# Patient Record
Sex: FEMALE | Race: OTHER | HISPANIC OR LATINO
[De-identification: names, ages, dates, MRNs, and addresses within clinical notes are randomized per-mention and may not be internally consistent; named-entity substitution may affect disease eponyms.]

---

## 2021-05-25 ENCOUNTER — APPOINTMENT (OUTPATIENT)
Dept: ANTEPARTUM | Facility: CLINIC | Age: 31
End: 2021-05-25

## 2021-05-25 PROBLEM — Z00.00 ENCOUNTER FOR PREVENTIVE HEALTH EXAMINATION: Status: ACTIVE | Noted: 2021-05-25

## 2021-06-03 ENCOUNTER — APPOINTMENT (OUTPATIENT)
Dept: ANTEPARTUM | Facility: CLINIC | Age: 31
End: 2021-06-03
Payer: COMMERCIAL

## 2021-06-03 ENCOUNTER — ASOB RESULT (OUTPATIENT)
Age: 31
End: 2021-06-03

## 2021-06-03 PROCEDURE — 99072 ADDL SUPL MATRL&STAF TM PHE: CPT

## 2021-06-03 PROCEDURE — 76813 OB US NUCHAL MEAS 1 GEST: CPT

## 2021-07-27 ENCOUNTER — ASOB RESULT (OUTPATIENT)
Age: 31
End: 2021-07-27

## 2021-07-27 ENCOUNTER — APPOINTMENT (OUTPATIENT)
Dept: ANTEPARTUM | Facility: CLINIC | Age: 31
End: 2021-07-27
Payer: COMMERCIAL

## 2021-07-27 PROCEDURE — 99072 ADDL SUPL MATRL&STAF TM PHE: CPT

## 2021-07-27 PROCEDURE — 76805 OB US >/= 14 WKS SNGL FETUS: CPT

## 2021-08-16 ENCOUNTER — APPOINTMENT (OUTPATIENT)
Dept: MATERNAL FETAL MEDICINE | Facility: CLINIC | Age: 31
End: 2021-08-16
Payer: COMMERCIAL

## 2021-08-16 ENCOUNTER — ASOB RESULT (OUTPATIENT)
Age: 31
End: 2021-08-16

## 2021-08-16 PROCEDURE — 99202 OFFICE O/P NEW SF 15 MIN: CPT | Mod: 95

## 2021-11-23 ENCOUNTER — INPATIENT (INPATIENT)
Facility: HOSPITAL | Age: 31
LOS: 2 days | Discharge: HOME | End: 2021-11-26
Attending: SPECIALIST | Admitting: SPECIALIST
Payer: COMMERCIAL

## 2021-11-23 ENCOUNTER — TRANSCRIPTION ENCOUNTER (OUTPATIENT)
Age: 31
End: 2021-11-23

## 2021-11-23 ENCOUNTER — RESULT REVIEW (OUTPATIENT)
Age: 31
End: 2021-11-23

## 2021-11-23 VITALS
RESPIRATION RATE: 16 BRPM | DIASTOLIC BLOOD PRESSURE: 74 MMHG | SYSTOLIC BLOOD PRESSURE: 126 MMHG | TEMPERATURE: 98 F | HEART RATE: 95 BPM

## 2021-11-23 LAB
BASOPHILS # BLD AUTO: 0.03 K/UL — SIGNIFICANT CHANGE UP (ref 0–0.2)
BASOPHILS NFR BLD AUTO: 0.3 % — SIGNIFICANT CHANGE UP (ref 0–2)
BLD GP AB SCN SERPL QL: SIGNIFICANT CHANGE UP
COVID-19 SPIKE DOMAIN AB INTERP: POSITIVE
COVID-19 SPIKE DOMAIN ANTIBODY RESULT: 183 U/ML — HIGH
EOSINOPHIL # BLD AUTO: 0.08 K/UL — SIGNIFICANT CHANGE UP (ref 0–0.5)
EOSINOPHIL NFR BLD AUTO: 0.8 % — SIGNIFICANT CHANGE UP (ref 0–6)
HCT VFR BLD CALC: 34.6 % — SIGNIFICANT CHANGE UP (ref 34.5–45)
HGB BLD-MCNC: 11.5 G/DL — SIGNIFICANT CHANGE UP (ref 11.5–15.5)
HIV 1 & 2 AB SERPL IA.RAPID: SIGNIFICANT CHANGE UP
IMM GRANULOCYTES NFR BLD AUTO: 0.7 % — SIGNIFICANT CHANGE UP (ref 0–1.5)
LYMPHOCYTES # BLD AUTO: 1.99 K/UL — SIGNIFICANT CHANGE UP (ref 1–3.3)
LYMPHOCYTES # BLD AUTO: 19 % — SIGNIFICANT CHANGE UP (ref 13–44)
MCHC RBC-ENTMCNC: 27.7 PG — SIGNIFICANT CHANGE UP (ref 27–34)
MCHC RBC-ENTMCNC: 33.2 GM/DL — SIGNIFICANT CHANGE UP (ref 32–36)
MCV RBC AUTO: 83.4 FL — SIGNIFICANT CHANGE UP (ref 80–100)
MONOCYTES # BLD AUTO: 0.56 K/UL — SIGNIFICANT CHANGE UP (ref 0–0.9)
MONOCYTES NFR BLD AUTO: 5.4 % — SIGNIFICANT CHANGE UP (ref 2–14)
NEUTROPHILS # BLD AUTO: 7.72 K/UL — HIGH (ref 1.8–7.4)
NEUTROPHILS NFR BLD AUTO: 73.8 % — SIGNIFICANT CHANGE UP (ref 43–77)
PLATELET # BLD AUTO: 213 K/UL — SIGNIFICANT CHANGE UP (ref 150–400)
RBC # BLD: 4.15 M/UL — SIGNIFICANT CHANGE UP (ref 3.8–5.2)
RBC # FLD: 16.5 % — HIGH (ref 10.3–14.5)
SARS-COV-2 IGG+IGM SERPL QL IA: 183 U/ML — HIGH
SARS-COV-2 IGG+IGM SERPL QL IA: POSITIVE
SARS-COV-2 RNA SPEC QL NAA+PROBE: SIGNIFICANT CHANGE UP
T PALLIDUM AB TITR SER: NEGATIVE — SIGNIFICANT CHANGE UP
WBC # BLD: 10.45 K/UL — SIGNIFICANT CHANGE UP (ref 3.8–10.5)
WBC # FLD AUTO: 10.45 K/UL — SIGNIFICANT CHANGE UP (ref 3.8–10.5)

## 2021-11-23 RX ORDER — CITRIC ACID/SODIUM CITRATE 300-500 MG
30 SOLUTION, ORAL ORAL ONCE
Refills: 0 | Status: COMPLETED | OUTPATIENT
Start: 2021-11-23 | End: 2021-11-23

## 2021-11-23 RX ORDER — OXYTOCIN 10 UNIT/ML
333.33 VIAL (ML) INJECTION
Qty: 20 | Refills: 0 | Status: COMPLETED | OUTPATIENT
Start: 2021-11-23 | End: 2021-11-23

## 2021-11-23 RX ORDER — SODIUM CHLORIDE 9 MG/ML
1000 INJECTION, SOLUTION INTRAVENOUS
Refills: 0 | Status: DISCONTINUED | OUTPATIENT
Start: 2021-11-23 | End: 2021-11-24

## 2021-11-23 RX ORDER — AMPICILLIN TRIHYDRATE 250 MG
1 CAPSULE ORAL EVERY 4 HOURS
Refills: 0 | Status: DISCONTINUED | OUTPATIENT
Start: 2021-11-23 | End: 2021-11-24

## 2021-11-23 RX ORDER — ACETAMINOPHEN 500 MG
975 TABLET ORAL ONCE
Refills: 0 | Status: COMPLETED | OUTPATIENT
Start: 2021-11-23 | End: 2021-11-23

## 2021-11-23 RX ORDER — OXYTOCIN 10 UNIT/ML
VIAL (ML) INJECTION
Qty: 30 | Refills: 0 | Status: DISCONTINUED | OUTPATIENT
Start: 2021-11-23 | End: 2021-11-24

## 2021-11-23 RX ORDER — AMPICILLIN TRIHYDRATE 250 MG
2 CAPSULE ORAL ONCE
Refills: 0 | Status: COMPLETED | OUTPATIENT
Start: 2021-11-23 | End: 2021-11-23

## 2021-11-23 RX ADMIN — Medication 108 GRAM(S): at 22:54

## 2021-11-23 RX ADMIN — Medication 108 GRAM(S): at 14:47

## 2021-11-23 RX ADMIN — Medication 975 MILLIGRAM(S): at 19:25

## 2021-11-23 RX ADMIN — SODIUM CHLORIDE 125 MILLILITER(S): 9 INJECTION, SOLUTION INTRAVENOUS at 10:41

## 2021-11-23 RX ADMIN — Medication 108 GRAM(S): at 18:44

## 2021-11-23 RX ADMIN — Medication 216 GRAM(S): at 10:45

## 2021-11-23 RX ADMIN — Medication 2 MILLIUNIT(S)/MIN: at 18:34

## 2021-11-23 RX ADMIN — Medication 30 MILLILITER(S): at 12:05

## 2021-11-23 NOTE — OB PROVIDER LABOR PROGRESS NOTE - ASSESSMENT
patient in labor   continue expectant management   FHRT reassuring
pt had a temp of 100.6, decreased to 100 s/p tylenol 975  pt SROM at 0500 this AM  cat 1 tracing  continuous fetal and toco monitoring

## 2021-11-23 NOTE — OB PROVIDER H&P - ATTENDING COMMENTS
patient admitted at 38 weeks 2 days in labor  presents with regular contractions and rupture of membranes  pregnancy uncomplicated  requesting an epidural  FHRT reassuring  GBS positive - intrapartum ampicillin  CVOID pending

## 2021-11-23 NOTE — OB PROVIDER H&P - ASSESSMENT
31y  @38w4d GA admitted in labor, SROM clear 0500 this morning.    A/P:   -Admit to L&D  -Consent  -Admission labs  -NPO, except ice chips   -IV fluids  -Fetus: Cat 1 tracing. Continuous toco and fetal monitoring.   -GBS: pos, on amp  -Analgesia: epidural PRN 31y  @38w4d GA admitted in early labor, SROM clear 0500 this morning.    A/P:   -Admit to L&D  -Consent  -Admission labs  -NPO, except ice chips   -IV fluids  -Fetus: Cat 1 tracing. Continuous toco and fetal monitoring.   -GBS: pos, on amp  -Analgesia: epidural PRN    discussed with Dr. Edwards 31y  @38w4d GA admitted in early labor with ruptured membranes.    A/P:   -Admit to L&D  -Consent  -Admission labs  -NPO, except ice chips   -IV fluids  -Fetus: Cat 1 tracing. Continuous toco and fetal monitoring.   -GBS: pos, on amp  -Analgesia: epidural PRN    discussed with Dr. Edwards 31y  @38w4d GA admitted in early labor with ruptured membranes.    A/P:   -Admit to L&D  -Consent  -Admission labs  -NPO, except ice chips   -IV fluids  -Fetus: Cat 1 tracing. Continuous toco and fetal monitoring.   -GBS: pos, on ampicillin  -Analgesia: epidural PRN    discussed with Dr. Edwards

## 2021-11-23 NOTE — OB PROVIDER H&P - NSHPPHYSICALEXAM_GEN_ALL_CORE
Vitals:  HR: 95 (23 Nov 2021 10:15) (95 - 95)  BP: 126/74 (23 Nov 2021 10:15) (126/74 - 126/74)    Gen: well-appearing  Resp: breathing comfortably on RA  Abd:  VE:    Bedside sono:    FHT:   [place]: Vitals:  HR: 95 (23 Nov 2021 10:15) (95 - 95)  BP: 126/74 (23 Nov 2021 10:15) (126/74 - 126/74)    Gen: well-appearing, in apparent discomfort w ctx  Resp: breathing comfortably on RA  Abd: nontender, gravid  VE: 2/100/-1    Bedside sono: cephalic, ant placenta    FHT: baseline 135, mod variability, +accels, -decels   [place]: q3m

## 2021-11-23 NOTE — OB RN DELIVERY SUMMARY - NS_SEPSISRSKCALC_OBGYN_ALL_OB_FT
No temperature has been documented for this patient in CPN or on the OB Flowsheet. Ensure the highest temperature during labor was documented on the OB Flowsheet.  No gestational age at birth has been documented. Ensure delivery date/time has been entered above.  Rupture of membranes must be entered above.   EOS calculated successfully. EOS Risk Factor: 0.5/1000 live births ([facility] national incidence); GA=38w5d; Temp=100.58; ROM=19.767; GBS='Positive'; Antibiotics='GBS specific antibiotics > 2 hrs prior to birth'

## 2021-11-23 NOTE — OB RN DELIVERY SUMMARY - NSSELHIDDEN_OBGYN_ALL_OB_FT
[NS_DeliveryAttending1_OBGYN_ALL_OB_FT:[id number]],[NS_DeliveryAssist1_OBGYN_ALL_OB_FT:[id number]=],[NS_DeliveryRN_OBGYN_ALL_OB_FT:[id number]] [NS_DeliveryAttending1_OBGYN_ALL_OB_FT:[id number]],[NS_DeliveryAssist1_OBGYN_ALL_OB_FT:[id number]],[NS_DeliveryRN_OBGYN_ALL_OB_FT:[id number]=],[NS_DeliveryAssist2_OBGYN_ALL_OB_FT:[email or web address]=]

## 2021-11-23 NOTE — OB PROVIDER H&P - HISTORY OF PRESENT ILLNESS
31y  @ GA by LMP c/w CRL admitted with rupture of membranes at term.    LMP: 21  ALKA: 12/3/21   31y  @38w4d GA by LMP c/w CRL c/o leakage of clear fluid since 0500 and painful ctx q3-5m since 0600. Pt states that she has been continuously leaking since 0500 this morning, and that her ctx have been regular and painful. She desires an epidural as soon as she is able. She endorses good fetal movement. Denies vaginal bleeding. No complaints.    LMP: 21  ALKA: 12/3/21    Pregnancy course:  GBS positive  covid vaccinated    POb: denies  PGyn: denies  PMH/PSH: denies  All: denies  Meds: PNV

## 2021-11-23 NOTE — OB RN PATIENT PROFILE - BILL OF RIGHTS/ADMISSION INFORMATION PROVIDED TO:
Patient is a 69y old  Male who presents with a chief complaint of Catheter associated UTI (24 Apr 2021 11:36)       HPI:  70 y/o male with history of urinary retention due to BPH with chronic indwelling bourne x 3 years, states last changed 3 days ago after it was leaking, HTN, HLD, legally blind, CKD-4 who lives at home with help from family for ADLs. He uses a walker at baseline. He was brought in for feeling generalized weakness earlier today and felt like he was going to pass out. Denies LOC, or falls. No CP, SOB, N/V. He was noted to have low grade temp at home and in ED was 100.6 orally. U/A off new bourne placed in ED with pyuria. No hypotension, no signs of new end organ damage. He was given Cefepime and Vancomycin. Currently feels back to baseline.  (19 Apr 2021 23:19) Was found to have HARLEEN by KDIGO criteria prompting renal consult.  PAtient sees Dr. Carr Out[facility].  Bourne catheter was changed in ER.  Has abdominal pain.  Denies any N/V/Diarrhea.        Follow up HARLEEN/CKD  No complaints    PAST MEDICAL & SURGICAL HISTORY:  Diabetes    Chronic anemia  baseline Hbg 8-9    CKD (chronic kidney disease) stage 3, GFR 30-59 ml/min    Other secondary cataract of both eyes  legally blind    Urinary retention  chronic bourne    HTN (hypertension)    No significant past surgical history         FAMILY HISTORY:  Family history of diabetes mellitus (Mother)    NC    Social History:Non smoker    MEDICATIONS  (STANDING):  dextrose 40% Gel 15 Gram(s) Oral once  dextrose 5%. 1000 milliLiter(s) (100 mL/Hr) IV Continuous <Continuous>  dextrose 5%. 1000 milliLiter(s) (50 mL/Hr) IV Continuous <Continuous>  dextrose 50% Injectable 25 Gram(s) IV Push once  dextrose 50% Injectable 12.5 Gram(s) IV Push once  dextrose 50% Injectable 25 Gram(s) IV Push once  ferrous    sulfate 325 milliGRAM(s) Oral daily  folic acid 1 milliGRAM(s) Oral daily  glucagon  Injectable 1 milliGRAM(s) IntraMuscular once  heparin   Injectable 5000 Unit(s) SubCutaneous every 12 hours  insulin lispro (ADMELOG) corrective regimen sliding scale   SubCutaneous three times a day before meals  insulin lispro (ADMELOG) corrective regimen sliding scale   SubCutaneous at bedtime  meropenem/vaborbactam IVPB 2 Gram(s) IV Intermittent every 8 hours  metoprolol tartrate 25 milliGRAM(s) Oral two times a day  multivitamin 1 Tablet(s) Oral daily  pantoprazole  Injectable 40 milliGRAM(s) IV Push daily  saccharomyces boulardii 250 milliGRAM(s) Oral two times a day  senna 2 Tablet(s) Oral daily  sodium bicarbonate 650 milliGRAM(s) Oral two times a day  sodium chloride 0.45%. 1000 milliLiter(s) (75 mL/Hr) IV Continuous <Continuous>  sodium chloride 0.45%. 1000 milliLiter(s) (75 mL/Hr) IV Continuous <Continuous>  sodium chloride 0.9% lock flush 3 milliLiter(s) IV Push every 8 hours  sucralfate 1 Gram(s) Oral four times a day  tamsulosin 0.8 milliGRAM(s) Oral at bedtime    MEDICATIONS  (PRN):  ondansetron Injectable 4 milliGRAM(s) IV Push every 6 hours PRN Nausea    Allergies    amoxicillin (Rash)    Intolerances         REVIEW OF SYSTEMS:    Review of Systems:   Constitutional: Denies fatigue  HEENT: Denies headaches and dizziness  Respiratory: denies SOB, cough, or wheezing  Cardiovascular: denies CP, palpitations  Gastrointestinal: Denies nausea, denies vomiting, diarrhea, constipation, + abdominal pain  Genitourinary: denies painful urination, increased frequency, urgency, or bloody urine  Skin: denies rashes or itching  Musculoskeletal: denies muscle aches, joint swelling  Neurologic: Denies generalized weakness, denies loss of sensation, numbness, or tingling    Vital Signs Last 24 Hrs  T(C): 36.7 (30 Apr 2021 04:44), Max: 36.7 (30 Apr 2021 04:44)  T(F): 98 (30 Apr 2021 04:44), Max: 98 (30 Apr 2021 04:44)  HR: 81 (30 Apr 2021 04:44) (76 - 81)  BP: 145/77 (30 Apr 2021 04:44) (137/79 - 145/77)  BP(mean): --  RR: 17 (30 Apr 2021 04:44) (17 - 18)  SpO2: 95% (30 Apr 2021 04:44) (95% - 97%)      PHYSICAL EXAM:    GENERAL: NAD  HEAD:  Atraumatic, Normocephalic  EYES: Blind  ENMT: No Drainage from nares, No drainage from ears  NECK: Supple, neck  veins full  NERVOUS SYSTEM:  Awake and Alert  CHEST/LUNG: Clear to percussion bilaterally; No rales, rhonchi, wheezing, or rubs  HEART: Regular rate and rhythm; No murmurs, rubs, or gallops  ABDOMEN: Soft, Nontender, Nondistended; Bowel sounds present  EXTREMITIES:  No Edema  SKIN: No rashes No obvious ecchymosis  +bourne with light yellow urine      LABS:                                                                             7.5    6.20  )-----------( 363      ( 30 Apr 2021 06:57 )             24.5     04-30    139  |  103  |  38.0<H>  ----------------------------<  153<H>  4.9   |  24.0  |  1.91<H>    Ca    9.1      30 Apr 2021 06:57                             Patient

## 2021-11-23 NOTE — OB PROVIDER LABOR PROGRESS NOTE - NS_SUBJECTIVE/OBJECTIVE_OBGYN_ALL_OB_FT
patient comfortable after epidural
pt feeling increased pressure  epi in place    Vital Signs Last 24 Hrs  T(C): 37.7 (23 Nov 2021 19:51), Max: 38.1 (23 Nov 2021 19:15)  T(F): 99.86 (23 Nov 2021 19:51), Max: 100.58 (23 Nov 2021 19:15)  HR: 96 (23 Nov 2021 20:02) (81 - 112)  BP: 129/56 (23 Nov 2021 20:02) (100/58 - 130/74)  RR: 18 (23 Nov 2021 19:51) (16 - 18)  SpO2: 99% (23 Nov 2021 12:29) (99% - 99%)

## 2021-11-24 LAB
COVID-19 NUCLEOCAPSID GAM AB INTERP: NEGATIVE — SIGNIFICANT CHANGE UP
COVID-19 NUCLEOCAPSID TOTAL GAM ANTIBODY RESULT: 0.08 INDEX — SIGNIFICANT CHANGE UP
HIV 1+2 AB+HIV1 P24 AG SERPL QL IA: SIGNIFICANT CHANGE UP
SARS-COV-2 IGG+IGM SERPL QL IA: 0.08 INDEX — SIGNIFICANT CHANGE UP
SARS-COV-2 IGG+IGM SERPL QL IA: NEGATIVE — SIGNIFICANT CHANGE UP

## 2021-11-24 PROCEDURE — 88307 TISSUE EXAM BY PATHOLOGIST: CPT | Mod: 26

## 2021-11-24 RX ORDER — INFLUENZA VIRUS VACCINE 15; 15; 15; 15 UG/.5ML; UG/.5ML; UG/.5ML; UG/.5ML
0.5 SUSPENSION INTRAMUSCULAR ONCE
Refills: 0 | Status: COMPLETED | OUTPATIENT
Start: 2021-11-24 | End: 2021-11-25

## 2021-11-24 RX ORDER — OXYCODONE HYDROCHLORIDE 5 MG/1
5 TABLET ORAL ONCE
Refills: 0 | Status: DISCONTINUED | OUTPATIENT
Start: 2021-11-24 | End: 2021-11-26

## 2021-11-24 RX ORDER — IBUPROFEN 200 MG
600 TABLET ORAL EVERY 6 HOURS
Refills: 0 | Status: DISCONTINUED | OUTPATIENT
Start: 2021-11-24 | End: 2021-11-26

## 2021-11-24 RX ORDER — PRAMOXINE HYDROCHLORIDE 150 MG/15G
1 AEROSOL, FOAM RECTAL EVERY 4 HOURS
Refills: 0 | Status: DISCONTINUED | OUTPATIENT
Start: 2021-11-24 | End: 2021-11-26

## 2021-11-24 RX ORDER — BENZOCAINE 10 %
1 GEL (GRAM) MUCOUS MEMBRANE EVERY 6 HOURS
Refills: 0 | Status: DISCONTINUED | OUTPATIENT
Start: 2021-11-24 | End: 2021-11-26

## 2021-11-24 RX ORDER — OXYCODONE HYDROCHLORIDE 5 MG/1
5 TABLET ORAL
Refills: 0 | Status: DISCONTINUED | OUTPATIENT
Start: 2021-11-24 | End: 2021-11-26

## 2021-11-24 RX ORDER — OXYTOCIN 10 UNIT/ML
333.33 VIAL (ML) INJECTION
Qty: 20 | Refills: 0 | Status: DISCONTINUED | OUTPATIENT
Start: 2021-11-24 | End: 2021-11-26

## 2021-11-24 RX ORDER — KETOROLAC TROMETHAMINE 30 MG/ML
30 SYRINGE (ML) INJECTION ONCE
Refills: 0 | Status: DISCONTINUED | OUTPATIENT
Start: 2021-11-24 | End: 2021-11-26

## 2021-11-24 RX ORDER — LANOLIN
1 OINTMENT (GRAM) TOPICAL EVERY 6 HOURS
Refills: 0 | Status: DISCONTINUED | OUTPATIENT
Start: 2021-11-24 | End: 2021-11-26

## 2021-11-24 RX ORDER — ACETAMINOPHEN 500 MG
975 TABLET ORAL
Refills: 0 | Status: DISCONTINUED | OUTPATIENT
Start: 2021-11-24 | End: 2021-11-26

## 2021-11-24 RX ORDER — DIBUCAINE 1 %
1 OINTMENT (GRAM) RECTAL EVERY 6 HOURS
Refills: 0 | Status: DISCONTINUED | OUTPATIENT
Start: 2021-11-24 | End: 2021-11-26

## 2021-11-24 RX ORDER — SIMETHICONE 80 MG/1
80 TABLET, CHEWABLE ORAL EVERY 4 HOURS
Refills: 0 | Status: DISCONTINUED | OUTPATIENT
Start: 2021-11-24 | End: 2021-11-26

## 2021-11-24 RX ORDER — AER TRAVELER 0.5 G/1
1 SOLUTION RECTAL; TOPICAL EVERY 4 HOURS
Refills: 0 | Status: DISCONTINUED | OUTPATIENT
Start: 2021-11-24 | End: 2021-11-26

## 2021-11-24 RX ORDER — IBUPROFEN 200 MG
600 TABLET ORAL EVERY 6 HOURS
Refills: 0 | Status: COMPLETED | OUTPATIENT
Start: 2021-11-24 | End: 2022-10-23

## 2021-11-24 RX ORDER — HYDROCORTISONE 1 %
1 OINTMENT (GRAM) TOPICAL EVERY 6 HOURS
Refills: 0 | Status: DISCONTINUED | OUTPATIENT
Start: 2021-11-24 | End: 2021-11-26

## 2021-11-24 RX ORDER — TETANUS TOXOID, REDUCED DIPHTHERIA TOXOID AND ACELLULAR PERTUSSIS VACCINE, ADSORBED 5; 2.5; 8; 8; 2.5 [IU]/.5ML; [IU]/.5ML; UG/.5ML; UG/.5ML; UG/.5ML
0.5 SUSPENSION INTRAMUSCULAR ONCE
Refills: 0 | Status: DISCONTINUED | OUTPATIENT
Start: 2021-11-24 | End: 2021-11-26

## 2021-11-24 RX ORDER — SODIUM CHLORIDE 9 MG/ML
3 INJECTION INTRAMUSCULAR; INTRAVENOUS; SUBCUTANEOUS EVERY 8 HOURS
Refills: 0 | Status: DISCONTINUED | OUTPATIENT
Start: 2021-11-24 | End: 2021-11-26

## 2021-11-24 RX ORDER — DIPHENHYDRAMINE HCL 50 MG
25 CAPSULE ORAL EVERY 6 HOURS
Refills: 0 | Status: DISCONTINUED | OUTPATIENT
Start: 2021-11-24 | End: 2021-11-26

## 2021-11-24 RX ORDER — MAGNESIUM HYDROXIDE 400 MG/1
30 TABLET, CHEWABLE ORAL
Refills: 0 | Status: DISCONTINUED | OUTPATIENT
Start: 2021-11-24 | End: 2021-11-26

## 2021-11-24 RX ADMIN — Medication 975 MILLIGRAM(S): at 10:16

## 2021-11-24 RX ADMIN — Medication 975 MILLIGRAM(S): at 20:35

## 2021-11-24 RX ADMIN — Medication 1 TABLET(S): at 12:00

## 2021-11-24 RX ADMIN — Medication 975 MILLIGRAM(S): at 03:13

## 2021-11-24 RX ADMIN — SODIUM CHLORIDE 3 MILLILITER(S): 9 INJECTION INTRAMUSCULAR; INTRAVENOUS; SUBCUTANEOUS at 21:26

## 2021-11-24 RX ADMIN — Medication 1000 MILLIUNIT(S)/MIN: at 01:47

## 2021-11-24 RX ADMIN — Medication 600 MILLIGRAM(S): at 12:00

## 2021-11-24 NOTE — DISCHARGE NOTE OB - HOSPITAL COURSE
s/p uncomplicated CS on 11/24/21. Patient transferred to post partum unit, uncomplicated hospital course. At the time of discharge patient was tolerating regular diet PO, ambulating, voiding, and demonstrating bowel function. Pain well controlled with pain medications PRN.

## 2021-11-24 NOTE — DISCHARGE NOTE OB - PATIENT PORTAL LINK FT
You can access the FollowMyHealth Patient Portal offered by [facility] by registering at the following website: [email or web address]. By joining [employer]’s FollowMyHealth portal, you will also be able to view your health information using other applications (apps) compatible with our system.

## 2021-11-24 NOTE — OB PROVIDER DELIVERY SUMMARY - NSPROVIDERDELIVERYNOTE_OBGYN_ALL_OB_FT
pt admitted for in early labor, ruptured, GBS pos on amp. intrapartum maternal temp resolved with tylenol. repeat temps wnl. pt progressed to 10cm and pushed effectively.  @0046, loose nuchal cord x1, reduced. female, apgars 9/9, weight 3160g. placenta delivered intact @005. Hemostasis noted, .

## 2021-11-24 NOTE — OB PROVIDER DELIVERY SUMMARY - NSSELHIDDEN_OBGYN_ALL_OB_FT
[NS_DeliveryAttending1_OBGYN_ALL_OB_FT:[id number]],[NS_DeliveryAssist1_OBGYN_ALL_OB_FT:[email or web address]=],[NS_DeliveryRN_OBGYN_ALL_OB_FT:[id number]=],[NS_DeliveryAssist2_OBGYN_ALL_OB_FT:[email or web address]=]

## 2021-11-24 NOTE — PROGRESS NOTE ADULT - SUBJECTIVE AND OBJECTIVE BOX
S: Patient doing well. Minimal lochia. No complaints.    O: Vital Signs Last 24 Hrs  T(C): 36.6 (24 Nov 2021 04:40), Max: 38.1 (23 Nov 2021 19:15)  T(F): 97.9 (24 Nov 2021 04:40), Max: 100.58 (23 Nov 2021 19:15)  HR: 76 (24 Nov 2021 04:40) (76 - 114)  BP: 103/67 (24 Nov 2021 04:40) (100/58 - 131/55)  BP(mean): --  RR: 18 (24 Nov 2021 04:40) (16 - 18)  SpO2: 96% (24 Nov 2021 04:40) (96% - 99%)    Gen: NAD  Breast feeding  Abd: soft, NT, ND, fundus firm below umbilicus  voiding well  Ext: no tenderness    Labs:                        11.5   10.45 )-----------( 213      ( 23 Nov 2021 11:32 )             34.6     Antibody Screen: NEG (11-23 @ 11:35)       A/P PP # 0  Doing well.  Routine post partum care.  Discharge home in AM.

## 2021-11-24 NOTE — DISCHARGE NOTE OB - CARE PROVIDER_API CALL
Zayra Wall)  [facility] Obstetrics and Gynecology  [facility] [place] [address]  Phone: [phone]  Fax: [phone]  Follow Up Time:

## 2021-11-25 LAB
HCT VFR BLD CALC: 32.2 % — LOW (ref 34.5–45)
HGB BLD-MCNC: 10.4 G/DL — LOW (ref 11.5–15.5)

## 2021-11-25 RX ORDER — ACETAMINOPHEN 500 MG
2 TABLET ORAL
Qty: 56 | Refills: 0
Start: 2021-11-25 | End: 2021-12-01

## 2021-11-25 RX ORDER — IBUPROFEN 200 MG
1 TABLET ORAL
Qty: 56 | Refills: 0
Start: 2021-11-25 | End: 2021-12-08

## 2021-11-25 RX ADMIN — INFLUENZA VIRUS VACCINE 0.5 MILLILITER(S): 15; 15; 15; 15 SUSPENSION INTRAMUSCULAR at 05:16

## 2021-11-25 RX ADMIN — Medication 600 MILLIGRAM(S): at 17:59

## 2021-11-25 RX ADMIN — Medication 975 MILLIGRAM(S): at 21:11

## 2021-11-25 RX ADMIN — Medication 600 MILLIGRAM(S): at 23:47

## 2021-11-25 RX ADMIN — Medication 600 MILLIGRAM(S): at 00:07

## 2021-11-25 RX ADMIN — Medication 600 MILLIGRAM(S): at 05:15

## 2021-11-25 NOTE — PROGRESS NOTE ADULT - SUBJECTIVE AND OBJECTIVE BOX
BIN TORRES is a 31y  now PPD#1 s/p spontaneous vaginal delivery at 38.5 weeks gestation, uncomplicated. Isolated intrapartum maternal temperature resolved with tylenol.    S:    No acute events overnight.   The patient has no complaints.  Pain controlled with current treatment regimen.   She is ambulating without difficulty and tolerating PO.   + flatus/-BM/+ voiding   She endorses appropriate lochia, which is decreasing.   She is breastfeeding without difficulty.   She denies fevers, chills, nausea and vomiting.   She denies lightheadedness, dizziness, palpitations, chest pain and SOB.     O:    T(C): 36.6 (21 @ 05:09), Max: 36.9 (21 @ 15:40)  HR: 71 (21 @ 05:09) (71 - 78)  BP: 108/71 (21 @ 05:09) (108/71 - 117/65)  RR: 18 (21 @ 05:09) (17 - 18)  SpO2: 97% (21 @ 05:09) (97% - 98%)    Gen: NAD, AOx3  CV: RRR, S1/S2 present  Pulm: CTAB  Breast: Nontender, non-engorged   Abdomen:  Soft, non-tender, non-distended, +bowel sounds  Uterus:  Fundus firm below umbilicus  VE:  Expectant lochia  Ext:  Non-tender and non-edematous                          11.5   10.45 )-----------( 213      ( 2021 11:32 )             34.6

## 2021-11-26 VITALS
OXYGEN SATURATION: 99 % | HEART RATE: 75 BPM | TEMPERATURE: 98 F | DIASTOLIC BLOOD PRESSURE: 80 MMHG | RESPIRATION RATE: 18 BRPM | SYSTOLIC BLOOD PRESSURE: 124 MMHG

## 2021-11-26 PROCEDURE — 88307 TISSUE EXAM BY PATHOLOGIST: CPT

## 2021-11-26 PROCEDURE — 36415 COLL VENOUS BLD VENIPUNCTURE: CPT

## 2021-11-26 PROCEDURE — 86901 BLOOD TYPING SEROLOGIC RH(D): CPT

## 2021-11-26 PROCEDURE — 86780 TREPONEMA PALLIDUM: CPT

## 2021-11-26 PROCEDURE — G0463: CPT

## 2021-11-26 PROCEDURE — U0003: CPT

## 2021-11-26 PROCEDURE — 59025 FETAL NON-STRESS TEST: CPT

## 2021-11-26 PROCEDURE — 85025 COMPLETE CBC W/AUTO DIFF WBC: CPT

## 2021-11-26 PROCEDURE — 86900 BLOOD TYPING SEROLOGIC ABO: CPT

## 2021-11-26 PROCEDURE — 85014 HEMATOCRIT: CPT

## 2021-11-26 PROCEDURE — 86703 HIV-1/HIV-2 1 RESULT ANTBDY: CPT

## 2021-11-26 PROCEDURE — 86850 RBC ANTIBODY SCREEN: CPT

## 2021-11-26 PROCEDURE — 86769 SARS-COV-2 COVID-19 ANTIBODY: CPT

## 2021-11-26 PROCEDURE — 59050 FETAL MONITOR W/REPORT: CPT

## 2021-11-26 PROCEDURE — 85018 HEMOGLOBIN: CPT

## 2021-11-26 PROCEDURE — U0005: CPT

## 2021-11-26 PROCEDURE — 87389 HIV-1 AG W/HIV-1&-2 AB AG IA: CPT

## 2021-11-26 PROCEDURE — 90686 IIV4 VACC NO PRSV 0.5 ML IM: CPT

## 2021-11-26 RX ADMIN — Medication 975 MILLIGRAM(S): at 09:23

## 2021-11-26 RX ADMIN — Medication 975 MILLIGRAM(S): at 02:02

## 2021-11-26 RX ADMIN — Medication 600 MILLIGRAM(S): at 05:51

## 2021-11-26 NOTE — PROGRESS NOTE ADULT - SUBJECTIVE AND OBJECTIVE BOX
BIN TORRES is a 31y  now PPD#2 s/p spontaneous vaginal delivery at 38.5 weeks gestation, uncomplicated. Isolated intrapartum maternal temperature resolved with tylenol.    S:    No acute events overnight.   The patient has no complaints.  Pain controlled with current treatment regimen.   She is ambulating without difficulty and tolerating PO.   + flatus/-BM/+ voiding   She endorses appropriate lochia, which is decreasing.   She is breastfeeding without difficulty.   She denies fevers, chills, nausea and vomiting.   She denies lightheadedness, dizziness, palpitations, chest pain and SOB.     O:    T(C): 36.6 (21 @ 05:09), Max: 36.9 (21 @ 15:40)  HR: 71 (21 @ 05:09) (71 - 78)  BP: 108/71 (21 @ 05:09) (108/71 - 117/65)  RR: 18 (21 @ 05:09) (17 - 18)  SpO2: 97% (21 @ 05:09) (97% - 98%)    Gen: NAD, AOx3  CV: RRR, S1/S2 present  Pulm: CTAB  Breast: Nontender, non-engorged   Abdomen:  Soft, non-tender, non-distended, +bowel sounds  Uterus:  Fundus firm below umbilicus  VE:  Expectant lochia  Ext:  Non-tender and non-edematous                          11.5   10.45 )-----------( 213      ( 2021 11:32 )             34.6                BIN TORRES is a 31y  now PPD#2 s/p spontaneous vaginal delivery at 38.5 weeks gestation, uncomplicated. Isolated intrapartum maternal temperature resolved with tylenol.    S:    No acute events overnight.   The patient has no complaints.  Pain controlled with current treatment regimen.   She is ambulating without difficulty and tolerating PO.   + flatus/-BM/+ voiding   She endorses appropriate lochia, which is decreasing.   She is breastfeeding without difficulty.   She denies fevers, chills, nausea and vomiting.   She denies lightheadedness, dizziness, palpitations, chest pain and SOB.     O:    Vital Signs Last 24 Hrs  T(C): 36.8 (2021 21:15), Max: 36.8 (2021 21:15)  T(F): 98.2 (2021 21:15), Max: 98.2 (2021 21:15)  HR: 79 (2021 21:15) (71 - 79)  BP: 127/79 (2021 21:15) (108/71 - 127/79)  RR: 18 (2021 21:15) (18 - 18)  SpO2: 96% (2021 21:15) (96% - 97%)    Gen: NAD, AOx3  CV: RRR, S1/S2 present  Pulm: CTAB  Breast: Nontender, non-engorged   Abdomen:  Soft, non-tender, non-distended, +bowel sounds  Uterus:  Fundus firm below umbilicus  VE:  Expectant lochia  Ext:  Non-tender and non-edematous                          11.5   10.45 )-----------( 213      ( 2021 11:32 )             34.6

## 2021-11-26 NOTE — PROGRESS NOTE ADULT - ATTENDING COMMENTS
Patient seen and examined by me.  Agree with resident note.  Pain well controlled.  Tolerating regular diet, no nausea or vomiting.  Breastfeeding.  Minimal lochia.  Ambulating.  She is voiding without difficulty. Denies HA, dizziness, CP, SOB, LE pain.  VSS.  Fundus firm.  Plan for DC home today.  DC instructions and call parameters reviewed.  RTO in 6 weeks for pp visit.
resident note reviewed  VS and labs reviewed  patient stable for discharge home

## 2021-11-26 NOTE — PROGRESS NOTE ADULT - ASSESSMENT
A/P:  31y  now PPD#1 s/p spontaneous vaginal delivery at 38.5 weeks gestation, uncomplicated. Isolated intrapartum maternal temperature resolved with tylenol.  -Vital signs stable  -Hgb: 11.5 -> AM labs pending   -Voiding, tolerating PO, bowel function nml   -Advance care as tolerated   -Continue routine postpartum care and education  -Healthy female infant  -Dispo: Patient to be discharged when meeting all postpartum and postoperative milestones and pending attending approval.
A/P:  31y  now PPD#2 s/p spontaneous vaginal delivery at 38.5 weeks gestation, uncomplicated. Isolated intrapartum maternal temperature resolved with tylenol.  -Vital signs stable  -Hgb: 11.5 -> 10.4  -Voiding, tolerating PO, bowel function nml   -Advance care as tolerated   -Continue routine postpartum care and education  -Healthy female infant  -Dispo: Patient meeting all postpartum and postoperative milestones. Anticipate discharge today pending attending approval.
left hand wrist pain

## 2021-11-29 ENCOUNTER — NON-APPOINTMENT (OUTPATIENT)
Age: 31
End: 2021-11-29

## 2021-12-09 LAB — SURGICAL PATHOLOGY STUDY: SIGNIFICANT CHANGE UP

## 2023-02-03 NOTE — DISCHARGE NOTE OB - WEAR SUPPORTIVE BRA
PreOp complete. Anesthesia consent and H&P needed. Belongings placed in the locker.   
Statement Selected

## 2024-06-10 NOTE — OB PROVIDER H&P - NSICDXNOPASTMEDICALHX_GEN_ALL_CORE
Kameron E Sampson called requesting a refill on the following medications:  Requested Prescriptions     Pending Prescriptions Disp Refills    rosuvastatin (CRESTOR) 10 MG tablet [Pharmacy Med Name: ROSUVASTATIN CALCIUM 10 MG TAB] 90 tablet 3     Sig: take 1 tablet by mouth nightly       Date of last visit: 8/3/2023  Date of next visit (if applicable):6/26/2024  Date of last refill: 6/27/2023 1 year  Pharmacy Name: [facility], [facility] [address]       <-- Click to add NO pertinent Past Medical History